# Patient Record
Sex: FEMALE | Race: BLACK OR AFRICAN AMERICAN | NOT HISPANIC OR LATINO | Employment: FULL TIME | URBAN - METROPOLITAN AREA
[De-identification: names, ages, dates, MRNs, and addresses within clinical notes are randomized per-mention and may not be internally consistent; named-entity substitution may affect disease eponyms.]

---

## 2017-09-12 ENCOUNTER — TRANSCRIBE ORDERS (OUTPATIENT)
Dept: RADIOLOGY | Facility: CLINIC | Age: 47
End: 2017-09-12

## 2017-09-12 ENCOUNTER — APPOINTMENT (OUTPATIENT)
Dept: LAB | Facility: CLINIC | Age: 47
End: 2017-09-12

## 2017-09-12 DIAGNOSIS — Z02.1 PRE-EMPLOYMENT HEALTH SCREENING EXAMINATION: Primary | ICD-10-CM

## 2017-09-12 DIAGNOSIS — Z02.1 PRE-EMPLOYMENT HEALTH SCREENING EXAMINATION: ICD-10-CM

## 2017-09-12 LAB — RUBV IGG SERPL IA-ACNC: 54.9 IU/ML

## 2017-09-12 PROCEDURE — 86765 RUBEOLA ANTIBODY: CPT

## 2017-09-12 PROCEDURE — 86735 MUMPS ANTIBODY: CPT

## 2017-09-12 PROCEDURE — 86480 TB TEST CELL IMMUN MEASURE: CPT

## 2017-09-12 PROCEDURE — 36415 COLL VENOUS BLD VENIPUNCTURE: CPT

## 2017-09-12 PROCEDURE — 86787 VARICELLA-ZOSTER ANTIBODY: CPT

## 2017-09-12 PROCEDURE — 86706 HEP B SURFACE ANTIBODY: CPT

## 2017-09-12 PROCEDURE — 86762 RUBELLA ANTIBODY: CPT

## 2017-09-13 LAB — HBV SURFACE AB SER-ACNC: 60.62 MIU/ML

## 2017-09-14 LAB
MEV IGG SER QL: NORMAL
MUV IGG SER QL: NORMAL
VZV IGG SER IA-ACNC: NORMAL

## 2017-09-15 LAB — QUANTIFERON-TB GOLD IN TUBE: NORMAL

## 2017-09-18 ENCOUNTER — APPOINTMENT (OUTPATIENT)
Dept: LAB | Facility: CLINIC | Age: 47
End: 2017-09-18

## 2017-09-18 ENCOUNTER — TRANSCRIBE ORDERS (OUTPATIENT)
Dept: LAB | Facility: CLINIC | Age: 47
End: 2017-09-18

## 2017-09-18 DIAGNOSIS — Z11.1 SCREENING EXAMINATION FOR PULMONARY TUBERCULOSIS: Primary | ICD-10-CM

## 2017-09-18 DIAGNOSIS — Z11.1 SCREENING EXAMINATION FOR PULMONARY TUBERCULOSIS: ICD-10-CM

## 2017-09-18 PROCEDURE — 36415 COLL VENOUS BLD VENIPUNCTURE: CPT

## 2017-09-18 PROCEDURE — 86480 TB TEST CELL IMMUN MEASURE: CPT

## 2017-09-21 LAB
ANNOTATION COMMENT IMP: NORMAL
GAMMA INTERFERON BACKGROUND BLD IA-ACNC: 0.04 IU/ML
M TB IFN-G BLD-IMP: NEGATIVE
M TB IFN-G CD4+ BCKGRND COR BLD-ACNC: 0.06 IU/ML
M TB IFN-G CD4+ T-CELLS BLD-ACNC: 0.1 IU/ML
MITOGEN IGNF BLD-ACNC: 7.14 IU/ML
QUANTIFERON-TB GOLD IN TUBE: NORMAL
SERVICE CMNT-IMP: NORMAL

## 2018-08-03 ENCOUNTER — APPOINTMENT (EMERGENCY)
Dept: RADIOLOGY | Facility: HOSPITAL | Age: 48
End: 2018-08-03
Payer: COMMERCIAL

## 2018-08-03 ENCOUNTER — HOSPITAL ENCOUNTER (EMERGENCY)
Facility: HOSPITAL | Age: 48
Discharge: HOME | End: 2018-08-03
Attending: EMERGENCY MEDICINE
Payer: COMMERCIAL

## 2018-08-03 VITALS
TEMPERATURE: 96.7 F | SYSTOLIC BLOOD PRESSURE: 127 MMHG | HEIGHT: 63 IN | WEIGHT: 180 LBS | HEART RATE: 67 BPM | OXYGEN SATURATION: 95 % | RESPIRATION RATE: 18 BRPM | BODY MASS INDEX: 31.89 KG/M2 | DIASTOLIC BLOOD PRESSURE: 67 MMHG

## 2018-08-03 DIAGNOSIS — M25.511 RIGHT SHOULDER PAIN: Primary | ICD-10-CM

## 2018-08-03 PROCEDURE — 99283 EMERGENCY DEPT VISIT LOW MDM: CPT

## 2018-08-03 PROCEDURE — 73030 X-RAY EXAM OF SHOULDER: CPT

## 2018-08-03 RX ORDER — LIDOCAINE 50 MG/G
1 PATCH TOPICAL ONCE
Status: DISCONTINUED | OUTPATIENT
Start: 2018-08-03 | End: 2018-08-03 | Stop reason: HOSPADM

## 2018-08-03 RX ORDER — METHOCARBAMOL 500 MG/1
500 TABLET, FILM COATED ORAL 2 TIMES DAILY
Qty: 10 TABLET | Refills: 0 | Status: SHIPPED | OUTPATIENT
Start: 2018-08-03 | End: 2018-08-25

## 2018-08-03 RX ADMIN — LIDOCAINE 1 PATCH: 50 PATCH CUTANEOUS at 11:56

## 2018-08-03 NOTE — DISCHARGE INSTRUCTIONS
Shoulder Pain, Ambulatory Care   GENERAL INFORMATION:   Shoulder pain  is a common problem and can affect your daily activities  Pain can be caused by a problem within your shoulder  Shoulder pain may also be caused by pain that spreads to your shoulder from another part of your body  Seek immediate care for the following symptoms:   · Severe pain    · Inability to move your arm or shoulder    · Numbness or tingling in your shoulder or arm  Treatment for shoulder pain  may include any of the following:  · Acetaminophen  decreases pain and fever  It is available without a doctor's order  Ask how much to take and how often to take it  Follow directions  Acetaminophen can cause liver damage if not taken correctly  · NSAIDs  help decrease swelling and pain or fever  This medicine is available with or without a doctor's order  NSAIDs can cause stomach bleeding or kidney problems in certain people  If you take blood thinner medicine, always ask your healthcare provider if NSAIDs are safe for you  Always read the medicine label and follow directions  · A steroid injection  may help decrease pain and swelling  · Surgery  may be needed for long-term pain and loss of function  Manage your symptoms:   · Apply ice  on your shoulder for 20 to 30 minutes every 2 hours or as directed  Use an ice pack, or put crushed ice in a plastic bag  Cover it with a towel  Ice helps prevent tissue damage and decreases swelling and pain  · Apply heat if ice does not help your symptoms  Apply heat on your shoulder for 20 to 30 minutes every 2 hours for as many days as directed  Heat helps decrease pain and muscle spasms  · Go to physical or occupational therapy as directed  A physical therapist teaches you exercises to help improve movement and strength, and to decrease pain  An occupational therapist teaches you skills to help with your daily activities  Prevent shoulder pain:   · Stretch and strengthen your shoulder  Use proper technique during exercises and sports  · Limit activities as directed  Try to avoid repeated overhead movements  Follow up with your healthcare provider or orthopedist as directed:  Write down your questions so you remember to ask them during your visits  CARE AGREEMENT:   You have the right to help plan your care  Learn about your health condition and how it may be treated  Discuss treatment options with your caregivers to decide what care you want to receive  You always have the right to refuse treatment  The above information is an  only  It is not intended as medical advice for individual conditions or treatments  Talk to your doctor, nurse or pharmacist before following any medical regimen to see if it is safe and effective for you  © 2014 [address] is for End User's use only and may not be sold, redistributed or otherwise used for commercial purposes  All illustrations and images included in CareNotes® are the copyrighted property of [employer]  or [employer]

## 2018-08-03 NOTE — ED PROVIDER NOTES
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, the patient fell backwards and she also fell,  Her shoulder and arm being caught between the body of the patient  And the bed  Patient reports pain over her right shoulder that radiates into her upper arm  States that she is able to  Range her shoulder however with pain  Denies any midline tenderness of her neck  Patient also complains of back pain, right-sided  No history of back pain  Has taken naproxen for pain with relief of back pain  She denies paresthesias, numbness, open wounds, bruising, swelling  Denies injury over the right arm past             None       History reviewed  No pertinent past medical history  History reviewed  No pertinent surgical history  History reviewed  No pertinent family history  I have reviewed and agree with the history as documented  Social History   Substance Use Topics    Smoking status: Never Smoker    Smokeless tobacco: Never Used    Alcohol use Yes      Comment: occassional        Review of Systems   Constitutional: Negative for chills and fever  Musculoskeletal: Positive for arthralgias, back pain and myalgias  Negative for gait problem, joint swelling, neck pain and neck stiffness  Skin: Negative for color change and wound  Neurological: Negative for weakness and numbness  Physical Exam  Physical Exam   Constitutional: She appears well-developed and well-nourished  No distress  HENT:   Head: Normocephalic  Pulmonary/Chest: Effort normal and breath sounds normal    Musculoskeletal: She exhibits tenderness  She exhibits no edema or deformity          Right shoulder: She exhibits decreased range of motion and tenderness  She exhibits no bony tenderness, no swelling, no effusion, no crepitus, no deformity, no laceration, no pain, no spasm, normal pulse and normal strength  Arms:  Mildly decreased ROM of right shoulder  5/5 strength  ttp over trapezius and over shoulder joint space  No swelling, bruising, rash, open wounds  NVI  Neurological: She is alert  Skin: Skin is warm  Capillary refill takes less than 2 seconds  No rash noted  She is not diaphoretic  No erythema  Psychiatric: She has a normal mood and affect  Nursing note and vitals reviewed  Vital Signs  ED Triage Vitals [08/03/18 1101]   Temperature Pulse Respirations Blood Pressure SpO2   (!) 96 7 °F (35 9 °C) 67 18 127/67 95 %      Temp Source Heart Rate Source Patient Position - Orthostatic VS BP Location FiO2 (%)   Tympanic Monitor Sitting Left arm --      Pain Score       8           Vitals:    08/03/18 1101   BP: 127/67   Pulse: 67   Patient Position - Orthostatic VS: Sitting       Visual Acuity      ED Medications  Medications - No data to display    Diagnostic Studies  Results Reviewed     None                 XR shoulder 2+ views RIGHT   ED Interpretation by Jose Alfredo Palomares PA-C (08/03 1201)   No fracture noted  Final Result by Rossy Austin MD (08/03 1209)      No acute osseous abnormality              Workstation performed: [id number]                    Procedures  Procedures       Phone Contacts  ED Phone Contact    ED Course                               MDM  Number of Diagnoses or Management Options  Right shoulder pain:     CritCare Time    Disposition  Final diagnoses:   Right shoulder pain     Time reflects when diagnosis was documented in both MDM as applicable and the Disposition within this note     Time User Action Codes Description Comment    8/3/2018 12:03 PM [place], [address] Right shoulder pain       ED Disposition     ED Disposition Condition Comment    Discharge Linda Brady discharge to home/self care  Condition at discharge: Good        Follow-up Information     Follow up With Specialties Details Why Contact Info    Annette Bey MD Occupational Medicine Schedule an appointment as soon as possible for a visit Follow up with occupational medicine if needed if pain persist   [address] [facility] [address]  [phone]            Discharge Medication List as of 8/3/2018 12:13 PM      START taking these medications    Details   methocarbamol (ROBAXIN) 500 mg tablet Take 1 tablet (500 mg total) by mouth 2 (two) times a day for 5 days, Starting Fri 8/3/2018, Until Wed 8/8/2018, Print           No discharge procedures on file      ED Provider  Electronically Signed by           Cynthia Yip PA-C  08/03/18 0272

## 2018-08-25 ENCOUNTER — APPOINTMENT (EMERGENCY)
Dept: RADIOLOGY | Facility: HOSPITAL | Age: 48
End: 2018-08-25
Payer: COMMERCIAL

## 2018-08-25 ENCOUNTER — HOSPITAL ENCOUNTER (EMERGENCY)
Facility: HOSPITAL | Age: 48
Discharge: HOME | End: 2018-08-25
Attending: EMERGENCY MEDICINE | Admitting: EMERGENCY MEDICINE
Payer: COMMERCIAL

## 2018-08-25 VITALS
HEART RATE: 85 BPM | SYSTOLIC BLOOD PRESSURE: 139 MMHG | TEMPERATURE: 97.7 F | BODY MASS INDEX: 32.78 KG/M2 | HEIGHT: 63 IN | DIASTOLIC BLOOD PRESSURE: 90 MMHG | OXYGEN SATURATION: 100 % | RESPIRATION RATE: 18 BRPM | WEIGHT: 185 LBS

## 2018-08-25 DIAGNOSIS — R10.9 ABDOMINAL PAIN: ICD-10-CM

## 2018-08-25 DIAGNOSIS — K83.8 COMMON BILE DUCT DILATION: Primary | ICD-10-CM

## 2018-08-25 LAB
ALBUMIN SERPL BCP-MCNC: 3.6 G/DL (ref 3.5–5)
ALP SERPL-CCNC: 53 U/L (ref 46–116)
ALT SERPL W P-5'-P-CCNC: 22 U/L (ref 12–78)
ANION GAP SERPL CALCULATED.3IONS-SCNC: 6 MMOL/L (ref 4–13)
AST SERPL W P-5'-P-CCNC: 20 U/L (ref 5–45)
BASOPHILS # BLD AUTO: 0.01 THOUSANDS/ΜL (ref 0–0.1)
BASOPHILS NFR BLD AUTO: 0 % (ref 0–1)
BILIRUB SERPL-MCNC: 0.41 MG/DL (ref 0.2–1)
BILIRUB UR QL STRIP: NEGATIVE
BUN SERPL-MCNC: 12 MG/DL (ref 5–25)
CALCIUM SERPL-MCNC: 8.8 MG/DL (ref 8.3–10.1)
CHLORIDE SERPL-SCNC: 105 MMOL/L (ref 100–108)
CLARITY UR: CLEAR
CLARITY, POC: CLEAR
CO2 SERPL-SCNC: 25 MMOL/L (ref 21–32)
COLOR UR: YELLOW
COLOR, POC: YELLOW
CREAT SERPL-MCNC: 0.91 MG/DL (ref 0.6–1.3)
EOSINOPHIL # BLD AUTO: 0.03 THOUSAND/ΜL (ref 0–0.61)
EOSINOPHIL NFR BLD AUTO: 1 % (ref 0–6)
ERYTHROCYTE [DISTWIDTH] IN BLOOD BY AUTOMATED COUNT: 13 % (ref 11.6–15.1)
EXT PREG TEST URINE: NORMAL
GFR SERPL CREATININE-BSD FRML MDRD: 85 ML/MIN/1.73SQ M
GLUCOSE SERPL-MCNC: 88 MG/DL (ref 65–140)
GLUCOSE UR STRIP-MCNC: NEGATIVE MG/DL
HCT VFR BLD AUTO: 36.8 % (ref 34.8–46.1)
HGB BLD-MCNC: 11.8 G/DL (ref 11.5–15.4)
HGB UR QL STRIP.AUTO: NEGATIVE
IMM GRANULOCYTES # BLD AUTO: 0.02 THOUSAND/UL (ref 0–0.2)
IMM GRANULOCYTES NFR BLD AUTO: 0 % (ref 0–2)
KETONES UR STRIP-MCNC: NEGATIVE MG/DL
LEUKOCYTE ESTERASE UR QL STRIP: NEGATIVE
LIPASE SERPL-CCNC: 131 U/L (ref 73–393)
LYMPHOCYTES # BLD AUTO: 1.34 THOUSANDS/ΜL (ref 0.6–4.47)
LYMPHOCYTES NFR BLD AUTO: 24 % (ref 14–44)
MCH RBC QN AUTO: 29.1 PG (ref 26.8–34.3)
MCHC RBC AUTO-ENTMCNC: 32.1 G/DL (ref 31.4–37.4)
MCV RBC AUTO: 91 FL (ref 82–98)
MONOCYTES # BLD AUTO: 0.36 THOUSAND/ΜL (ref 0.17–1.22)
MONOCYTES NFR BLD AUTO: 7 % (ref 4–12)
NEUTROPHILS # BLD AUTO: 3.74 THOUSANDS/ΜL (ref 1.85–7.62)
NEUTS SEG NFR BLD AUTO: 68 % (ref 43–75)
NITRITE UR QL STRIP: NEGATIVE
NRBC BLD AUTO-RTO: 0 /100 WBCS
PH UR STRIP.AUTO: 7 [PH] (ref 4.5–8)
PLATELET # BLD AUTO: 291 THOUSANDS/UL (ref 149–390)
PMV BLD AUTO: 9.1 FL (ref 8.9–12.7)
POTASSIUM SERPL-SCNC: 4.1 MMOL/L (ref 3.5–5.3)
PROT SERPL-MCNC: 7.3 G/DL (ref 6.4–8.2)
PROT UR STRIP-MCNC: NEGATIVE MG/DL
RBC # BLD AUTO: 4.05 MILLION/UL (ref 3.81–5.12)
SODIUM SERPL-SCNC: 136 MMOL/L (ref 136–145)
SP GR UR STRIP.AUTO: 1.01 (ref 1–1.03)
UROBILINOGEN UR QL STRIP.AUTO: 0.2 E.U./DL
WBC # BLD AUTO: 5.5 THOUSAND/UL (ref 4.31–10.16)

## 2018-08-25 PROCEDURE — 76705 ECHO EXAM OF ABDOMEN: CPT

## 2018-08-25 PROCEDURE — 85025 COMPLETE CBC W/AUTO DIFF WBC: CPT | Performed by: EMERGENCY MEDICINE

## 2018-08-25 PROCEDURE — 74177 CT ABD & PELVIS W/CONTRAST: CPT

## 2018-08-25 PROCEDURE — 80053 COMPREHEN METABOLIC PANEL: CPT | Performed by: EMERGENCY MEDICINE

## 2018-08-25 PROCEDURE — 36415 COLL VENOUS BLD VENIPUNCTURE: CPT

## 2018-08-25 PROCEDURE — 93005 ELECTROCARDIOGRAM TRACING: CPT

## 2018-08-25 PROCEDURE — 81025 URINE PREGNANCY TEST: CPT | Performed by: EMERGENCY MEDICINE

## 2018-08-25 PROCEDURE — 96375 TX/PRO/DX INJ NEW DRUG ADDON: CPT

## 2018-08-25 PROCEDURE — 96374 THER/PROPH/DIAG INJ IV PUSH: CPT

## 2018-08-25 PROCEDURE — 99284 EMERGENCY DEPT VISIT MOD MDM: CPT

## 2018-08-25 PROCEDURE — 99242 OFF/OP CONSLTJ NEW/EST SF 20: CPT | Performed by: SURGERY

## 2018-08-25 PROCEDURE — 83690 ASSAY OF LIPASE: CPT | Performed by: EMERGENCY MEDICINE

## 2018-08-25 PROCEDURE — 81003 URINALYSIS AUTO W/O SCOPE: CPT

## 2018-08-25 RX ORDER — UBIDECARENONE 75 MG
CAPSULE ORAL DAILY
COMMUNITY

## 2018-08-25 RX ORDER — MULTIVITAMIN
1 CAPSULE ORAL DAILY
COMMUNITY

## 2018-08-25 RX ORDER — FAMOTIDINE 20 MG/1
20 TABLET, FILM COATED ORAL 2 TIMES DAILY
Qty: 30 TABLET | Refills: 0 | Status: SHIPPED | OUTPATIENT
Start: 2018-08-25 | End: 2022-03-22

## 2018-08-25 RX ORDER — ONDANSETRON 2 MG/ML
4 INJECTION INTRAMUSCULAR; INTRAVENOUS ONCE
Status: COMPLETED | OUTPATIENT
Start: 2018-08-25 | End: 2018-08-25

## 2018-08-25 RX ORDER — ONDANSETRON 4 MG/1
4 TABLET, FILM COATED ORAL EVERY 6 HOURS
Qty: 12 TABLET | Refills: 0 | Status: SHIPPED | OUTPATIENT
Start: 2018-08-25

## 2018-08-25 RX ADMIN — IOHEXOL 100 ML: 350 INJECTION, SOLUTION INTRAVENOUS at 13:57

## 2018-08-25 RX ADMIN — ONDANSETRON 4 MG: 2 INJECTION INTRAMUSCULAR; INTRAVENOUS at 13:35

## 2018-08-25 RX ADMIN — HYDROMORPHONE HYDROCHLORIDE 0.5 MG: 1 INJECTION, SOLUTION INTRAMUSCULAR; INTRAVENOUS; SUBCUTANEOUS at 15:20

## 2018-08-25 NOTE — CONSULTS
Consultation - General Surgery   Shabana Schaeffer 46 y o  female MRN: [id number]  Unit/Bed#: ED 25 Encounter: [id number]    Assessment/Plan     Assessment:  48yF w/ non-specific abdominal pain  No RUQ pain  Pain is not surgical and unrelated to dilated CBD    Plan:  Rec GI workup - consider outpt depending on whether pain persists in here    History of Present Illness     HPI:  Shabana Schaeffer is a 46 y o  female with no past medical or surgical history who presents with acute onset abdominal pain  Around 7AM she had a cup of soup  About half an hour later she developed intense left sided abdominal pain  The pain radiates to the midline and up to the epigastric area  There is no associated fever, chills, nausea  She states she has been having normal bowel movements  She had an episode like this a few months ago that last several hours and then went away  Labs including LFTs, lipase, CBC were all normal  CT only showed a 17mm CBD  There are no stones seen, no pancreatic masses  She denies RUQ pain  RUQ is completely non-tender to palpation  US was done showing a 13mm CBD with moderate intrahepatic biliary dilation - there was no choledocholithiasis  Consult to Surgery - General  Consult performed by: Marisa Scales ordered by: Eugene Teixeira          Review of Systems   Constitutional: Negative for chills and fever  Respiratory: Negative for chest tightness and shortness of breath  Cardiovascular: Negative for chest pain and palpitations  Gastrointestinal: Positive for abdominal pain  Negative for abdominal distention, nausea and vomiting  Genitourinary: Negative for dysuria and flank pain  Musculoskeletal: Negative for back pain and neck pain  Skin: Negative for rash and wound  Allergic/Immunologic: Negative for environmental allergies and food allergies  Neurological: Negative for dizziness and light-headedness  All other systems reviewed and are negative        Historical Information   No past medical history on file  No past surgical history on file  Social History   History   Alcohol Use    Yes     Comment: occassional     History   Drug Use No     History   Smoking Status    Never Smoker   Smokeless Tobacco    Never Used     Family History: non-contributory    Meds/Allergies   all current active meds have been reviewed, current meds:   No current facility-administered medications for this encounter  and PTA meds:   Prior to Admission Medications   Prescriptions Last Dose Informant Patient Reported? Taking? COLLAGEN PO   Yes Yes   Sig: Take by mouth   Melatonin-Pyridoxine (MELATIN PO)   Yes Yes   Sig: Take by mouth as needed   Multiple Vitamin (MULTIVITAMIN) capsule   Yes Yes   Sig: Take 1 capsule by mouth daily   cyanocobalamin (VITAMIN B-12) 100 mcg tablet   Yes Yes   Sig: Take by mouth daily      Facility-Administered Medications: None     No Known Allergies    Objective   First Vitals:   Blood Pressure: 124/74 (08/25/18 1107)  Pulse: 71 (08/25/18 1107)  Temperature: 97 7 °F (36 5 °C) (08/25/18 1107)  Temp Source: Oral (08/25/18 1107)  Respirations: 20 (08/25/18 1107)  Height: 5' 3" (160 cm) (08/25/18 1107)  Weight - Scale: 83 9 kg (185 lb) (08/25/18 1107)  SpO2: 98 % (08/25/18 1107)    Current Vitals:   Blood Pressure: 139/90 (08/25/18 1523)  Pulse: 104 (08/25/18 1523)  Temperature: 97 7 °F (36 5 °C) (08/25/18 1107)  Temp Source: Oral (08/25/18 1107)  Respirations: 22 (08/25/18 1523)  Height: 5' 3" (160 cm) (08/25/18 1107)  Weight - Scale: 83 9 kg (185 lb) (08/25/18 1107)  SpO2: 100 % (08/25/18 1523)    No intake or output data in the 24 hours ending 08/25/18 1527    Invasive Devices     Peripheral Intravenous Line            Peripheral IV 08/25/18 Left Antecubital less than 1 day                Physical Exam   Constitutional: She is oriented to person, place, and time  She appears well-developed and well-nourished  HENT:   Head: Normocephalic and atraumatic  Eyes: EOM are normal  Pupils are equal, round, and reactive to light  Cardiovascular: Normal rate and regular rhythm  Pulmonary/Chest: Effort normal  No respiratory distress  Abdominal: Soft  She exhibits no distension and no mass  There is tenderness  There is no rebound and no guarding  Musculoskeletal: Normal range of motion  She exhibits no edema or deformity  Neurological: She is alert and oriented to person, place, and time  Skin: Skin is warm  No rash noted  Psychiatric: She has a normal mood and affect  Her behavior is normal  Judgment and thought content normal        Lab Results:   I have personally reviewed pertinent lab results  , CBC:   Lab Results   Component Value Date    WBC 5 50 08/25/2018    HGB 11 8 08/25/2018    HCT 36 8 08/25/2018    MCV 91 08/25/2018     08/25/2018    MCH 29 1 08/25/2018    MCHC 32 1 08/25/2018    RDW 13 0 08/25/2018    MPV 9 1 08/25/2018    NRBC 0 08/25/2018   , CMP:   Lab Results   Component Value Date     08/25/2018    K 4 1 08/25/2018     08/25/2018    CO2 25 08/25/2018    ANIONGAP 6 08/25/2018    BUN 12 08/25/2018    CREATININE 0 91 08/25/2018    GLUCOSE 88 08/25/2018    CALCIUM 8 8 08/25/2018    AST 20 08/25/2018    ALT 22 08/25/2018    ALKPHOS 53 08/25/2018    PROT 7 3 08/25/2018    BILITOT 0 41 08/25/2018    EGFR 85 08/25/2018     Imaging: I have personally reviewed pertinent reports  EKG, Pathology, and Other Studies: I have personally reviewed pertinent reports  and I have personally reviewed pertinent films in PACS    Counseling / Coordination of Care  Total floor / unit time spent today 20 minutes  Greater than 50% of total time was spent with the patient and / or family counseling and / or coordination of care  A description of the counseling / coordination of care: 20

## 2018-08-25 NOTE — ED ATTENDING ATTESTATION
Litzy Mehta MD, saw and evaluated the patient  I have discussed the patient with the resident/non-physician practitioner and agree with the resident's/non-physician practitioner's findings, Plan of Care, and MDM as documented in the resident's/non-physician practitioner's note, except where noted  All available labs and Radiology studies were reviewed  At this point I agree with the current assessment done in the Emergency Department  I have conducted an independent evaluation of this patient a history and physical is as follows:    OA: 47 y/o f with vague LLQ pain since awakening this morning and has been progressive since onset  + chills, no fevers  Pain is 8/10, constant and sharp "like someone is wringing my bowls " Denies n/v/d  No change in stools  Tolerated PO this morning but only a small amount  Admits to eating ceviche on Thursday night, no one else at the same food  Had similar, less intense episode of pain a few months ago, did not seek evaluation with pcp at that time  No previous abdominal surgeries  Denies cp/chest pressure/sob/CARRILLO/palpitations  PE, well developed f nontoxic but uncomfortable appearing, VSS, Nc/AT, MMM, anicteric sclera/conjunctiva, neck supple/FROM, RR, lungs CTAB, abd soft, +BS, + ttp diffusely over abdomen with most notable over LLQ, - r/g, - mass, - CVA ttp, - LE edema/calf ttp, + 2 distal pulses, capillary refill < 2 sec, AAO  A/p abd pain, EKG given age however denies CP pain, Well's 0, labs, CT imaging, pt currently declining pain control, trial of anti-emetic, re-eval and treat accordingly        Critical Care Time  CritCare Time    Procedures

## 2018-08-25 NOTE — ED RE-EVALUATION NOTE
Pt with improved sxms asking for food  Evaluated by surgery, recommends outpatient GI f/u  Pt would like to go, declines observational admission  Understands return and f/u precautions        Jose Barcenas MD  08/28/18 5146

## 2018-08-25 NOTE — DISCHARGE INSTRUCTIONS
Abdominal Pain   WHAT YOU NEED TO KNOW:   Abdominal pain can be dull, achy, or sharp  You may have pain in one area of your abdomen, or in your entire abdomen  Your pain may be caused by a condition such as constipation, food sensitivity or poisoning, infection, or a blockage  Abdominal pain can also be from a hernia, appendicitis, or an ulcer  Liver, gallbladder, or kidney conditions can also cause abdominal pain  The cause of your abdominal pain may be unknown  DISCHARGE INSTRUCTIONS:   Return to the emergency department if:   · You have new chest pain or shortness of breath  · You have pulsing pain in your upper abdomen or lower back that suddenly becomes constant  · Your pain is in the right lower abdominal area and worsens with movement  · You have a fever over 100 4°F (38°C) or shaking chills  · You are vomiting and cannot keep food or liquids down  · Your pain does not improve or gets worse over the next 8 to 12 hours  · You see blood in your vomit or bowel movements, or they look black and tarry  · Your skin or the whites of your eyes turn yellow  · You are a woman and have a large amount of vaginal bleeding that is not your monthly period  Contact your healthcare provider if:   · You have pain in your lower back  · You are a man and have pain in your testicles  · You have pain when you urinate  · You have questions or concerns about your condition or care  Follow up with your healthcare provider within 24 hours or as directed:  Write down your questions so you remember to ask them during your visits  Medicines:   · Medicines  may be given to calm your stomach and prevent vomiting or to decrease pain  Ask how to take pain medicine safely  · Take your medicine as directed  Contact your healthcare provider if you think your medicine is not helping or if you have side effects  Tell him of her if you are allergic to any medicine   Keep a list of the medicines, vitamins, and herbs you take  Include the amounts, and when and why you take them  Bring the list or the pill bottles to follow-up visits  Carry your medicine list with you in case of an emergency  © 2017 [address] Information is for End User's use only and may not be sold, redistributed or otherwise used for commercial purposes  All illustrations and images included in CareNotes® are the copyrighted property of [employer]  or [employer]thing[employer]  The above information is an  only  It is not intended as medical advice for individual conditions or treatments  Talk to your doctor, nurse or pharmacist before following any medical regimen to see if it is safe and effective for you

## 2018-08-25 NOTE — ED PROVIDER NOTES
History  Chief Complaint   Patient presents with    Abdominal Pain     PT "I woke up this morning with this really bad abd pain  I thought it was not b/c I had eaten, so when I got to work I had some coffee and some cream soup  And its just getting worse" Pt c/o lower abd pain that is intermitten     HPI    57-year-old female with a past medical history of GERD presents for evaluation of abdominal pain  Patient says she woke up with vague abdominal pain this morning which has progressively worsened  LLQ pain that began this morning which she thought were hunger pains since she missed dinner last night  She notes having similar pain a few months ago which lasted a few hours but was not as intense  Patient had coffee, water and soup when she got to work but did not have without in pain  Few minutes later she was doubled over with 10/10 pain that was constant with intervals of increased severity  Admits to hot flashes and dizziness earlier today  No fever, chest pain, SOB, nausea, vomiting, diarrhea, constipation, dysuria, or back pain  Prior to Admission Medications   Prescriptions Last Dose Informant Patient Reported? Taking? COLLAGEN PO   Yes Yes   Sig: Take by mouth   Melatonin-Pyridoxine (MELATIN PO)   Yes Yes   Sig: Take by mouth as needed   Multiple Vitamin (MULTIVITAMIN) capsule   Yes Yes   Sig: Take 1 capsule by mouth daily   cyanocobalamin (VITAMIN B-12) 100 mcg tablet   Yes Yes   Sig: Take by mouth daily      Facility-Administered Medications: None       No past medical history on file  No past surgical history on file  No family history on file  I have reviewed and agree with the history as documented  Social History   Substance Use Topics    Smoking status: Never Smoker    Smokeless tobacco: Never Used    Alcohol use Yes      Comment: occassional        Review of Systems   Constitutional: Negative for chills, diaphoresis, fatigue and fever     HENT: Negative for congestion, rhinorrhea and sore throat  Eyes: Negative for photophobia and visual disturbance  Respiratory: Negative for cough, chest tightness and shortness of breath  Cardiovascular: Negative for chest pain and palpitations  Gastrointestinal: Positive for abdominal pain  Negative for blood in stool, constipation, diarrhea, nausea and vomiting  Genitourinary: Positive for frequency  Negative for dysuria and hematuria  Musculoskeletal: Negative for back pain, gait problem, myalgias, neck pain and neck stiffness  Skin: Negative for pallor and rash  Neurological: Negative for weakness, light-headedness, numbness and headaches  Hematological: Negative for adenopathy  Does not bruise/bleed easily  All other systems reviewed and are negative  Physical Exam  ED Triage Vitals [08/25/18 1107]   Temperature Pulse Respirations Blood Pressure SpO2   97 7 °F (36 5 °C) 71 20 124/74 98 %      Temp Source Heart Rate Source Patient Position - Orthostatic VS BP Location FiO2 (%)   Oral Monitor Sitting Right arm --      Pain Score       6           Orthostatic Vital Signs  Vitals:    08/25/18 1233 08/25/18 1453 08/25/18 1523 08/25/18 1701   BP: 122/81 114/68 139/90    Pulse: 64 70 104 85   Patient Position - Orthostatic VS: Sitting Sitting Sitting        Physical Exam   Constitutional: She is oriented to person, place, and time  She appears well-developed and well-nourished  No distress  Patient is alert and oriented, appears well and nontoxic, in no acute distress   HENT:   Head: Normocephalic and atraumatic  Mouth/Throat: Oropharynx is clear and moist  No oropharyngeal exudate  Eyes: Conjunctivae and EOM are normal  Pupils are equal, round, and reactive to light  Neck: Normal range of motion  Neck supple  Cardiovascular: Normal rate, regular rhythm, normal heart sounds and intact distal pulses  Pulmonary/Chest: Effort normal and breath sounds normal  No respiratory distress  Abdominal: Soft   Bowel sounds are normal  She exhibits no distension and no mass  There is tenderness  There is no rebound and no guarding  No hernia  Diffuse tenderness throughout   Musculoskeletal: Normal range of motion  Lymphadenopathy:     She has no cervical adenopathy  Neurological: She is alert and oriented to person, place, and time  No facial asymmetry noted, CN 2-12 intact, full ROM of upper and lower extremities, muscle strength 5/5 throughout, DTRs normal, sensation intact throughout   Skin: Skin is warm and dry  Capillary refill takes less than 2 seconds  No rash noted  She is not diaphoretic  No erythema  No pallor  Psychiatric: She has a normal mood and affect  Her behavior is normal  Judgment and thought content normal    Nursing note and vitals reviewed  ED Medications  Medications   ondansetron (ZOFRAN) injection 4 mg (4 mg Intravenous Given 8/25/18 1335)   iohexol (OMNIPAQUE) 350 MG/ML injection (MULTI-DOSE) 100 mL (100 mL Intravenous Given 8/25/18 1357)   HYDROmorphone (DILAUDID) injection 0 5 mg (0 5 mg Intravenous Given 8/25/18 1520)       Diagnostic Studies  Results Reviewed     Procedure Component Value Units Date/Time    Comprehensive metabolic panel [[id number]] Collected:  08/25/18 1118    Lab Status:  Final result Specimen:  Blood from Arm, Left Updated:  08/25/18 1204     Sodium 136 mmol/L      Potassium 4 1 mmol/L      Chloride 105 mmol/L      CO2 25 mmol/L      Anion Gap 6 mmol/L      BUN 12 mg/dL      Creatinine 0 91 mg/dL      Glucose 88 mg/dL      Calcium 8 8 mg/dL      AST 20 U/L      ALT 22 U/L      Alkaline Phosphatase 53 U/L      Total Protein 7 3 g/dL      Albumin 3 6 g/dL      Total Bilirubin 0 41 mg/dL      eGFR 85 ml/min/1 73sq m     Narrative:         National Kidney Disease Education Program recommendations are as follows:  GFR calculation is accurate only with a steady state creatinine  Chronic Kidney disease less than 60 ml/min/1 73 sq  meters  Kidney failure less than 15 ml/min/1 73 sq  meters      Lipase [[id number]]  (Normal) Collected:  08/25/18 1118    Lab Status:  Final result Specimen:  Blood from Arm, Left Updated:  08/25/18 1204     Lipase 131 u/L     POCT urinalysis dipstick [[id number]]  (Normal) Resulted:  08/25/18 1139    Lab Status:  Final result Specimen:  Urine from Urine, Other Updated:  08/25/18 1140     Color, UA Yellow     Clarity, UA Clear    POCT pregnancy, urine [[id number]]  (Normal) Resulted:  08/25/18 1139    Lab Status:  Final result Specimen:  Urine Updated:  08/25/18 1139     EXT PREG TEST UR (Ref: Negative) Negative (-)    ED Urine Macroscopic [[id number]] Collected:  08/25/18 1152    Lab Status:  Final result Specimen:  Urine Updated:  08/25/18 1138     Color, UA Yellow     Clarity, UA Clear     pH, UA 7 0     Leukocytes, UA Negative     Nitrite, UA Negative     Protein, UA Negative mg/dl      Glucose, UA Negative mg/dl      Ketones, UA Negative mg/dl      Urobilinogen, UA 0 2 E U /dl      Bilirubin, UA Negative     Blood, UA Negative     Specific Gravity, UA 1 015    Narrative:       CLINITEK RESULT    CBC and differential [[id number]] Collected:  08/25/18 1118    Lab Status:  Final result Specimen:  Blood from Arm, Left Updated:  08/25/18 1130     WBC 5 50 Thousand/uL      RBC 4 05 Million/uL      Hemoglobin 11 8 g/dL      Hematocrit 36 8 %      MCV 91 fL      MCH 29 1 pg      MCHC 32 1 g/dL      RDW 13 0 %      MPV 9 1 fL      Platelets 598 Thousands/uL      nRBC 0 /100 WBCs      Neutrophils Relative 68 %      Immat GRANS % 0 %      Lymphocytes Relative 24 %      Monocytes Relative 7 %      Eosinophils Relative 1 %      Basophils Relative 0 %      Neutrophils Absolute 3 74 Thousands/µL      Immature Grans Absolute 0 02 Thousand/uL      Lymphocytes Absolute 1 34 Thousands/µL      Monocytes Absolute 0 36 Thousand/µL      Eosinophils Absolute 0 03 Thousand/µL      Basophils Absolute 0 01 Thousands/µL                  US gallbladder   Final Result by Donita Chowdhury MD (08/25 1622) 1   Common bile duct is dilated up to 1 3 cm near the velasquez hepatis  The distal common bile duct is not visualized  Moderate intrahepatic biliary dilation  2   Unremarkable gallbladder  No cholelithiasis or choledocholithiasis is visualized  Consider MRCP for elucidation of an underlying etiology of the biliary dilation  Workstation performed: [id number]         CT abdomen pelvis with contrast   Final Result by Manoj Gilliam MD (08/25 1431)      Dilated common bile duct measuring 17 mm  Follow-up outpatient nonemergent contrast enhanced abdominal MRI/MRCP is recommended for further evaluation  Thickening of the distal rectal or vulvar region, correlate with direct visualization  Workstation performed: [id number]               Procedures  Procedures      Phone Consults  ED Phone Contact    ED Course  ED Course as of Aug 25 1954   Sat Aug 25, 2018   1433 CTAP: Dilated common bile duct measuring 17 mm  Follow-up outpatient nonemergent contrast enhanced abdominal MRI/MRCP is recommended for further evaluation  [id number] Patient notes improvement of pain  Discussed lab and imaging results with patient  General surgery was consulted as well and discussed results with patient, recommending outpatient GI workup  Offered patient PO challenge here but declined, patient would like to go home                             [place]' Criteria for PE      Most Recent Value   Wells' Criteria for PE   Clinical signs and symptoms of DVT  0 Filed at: 08/25/2018 1725   PE is primary diagnosis or equally likely  0 Filed at: 08/25/2018 1725   HR >100  0 Filed at: 08/25/2018 1725   Immobilization at least 3 days or Surgery in the previous 4 weeks  0 Filed at: 08/25/2018 1725   Previous, objectively diagnosed PE or DVT  0 Filed at: 08/25/2018 1725   Hemoptysis  0 Filed at: 08/25/2018 1725   Malignancy with treatment within 6 months or palliative  0 Filed at: 08/25/2018 1725   Wells' Criteria Total  0 Filed at: 08/25/2018 1725            [facility]  Number of Diagnoses or Management Options  Abdominal pain:   Common bile duct dilation:   Diagnosis management comments: Impression: 50yo male presents for evaluation of abdominal pain  Ddx: diverticulitis, UTI, gastritis  Plan: cbc, cmp, lipase, ua, CTAP    The patient was instructed to follow up as documented  Strict return precautions were discussed with the patient and the patient was instructed to return to the emergency department immediately if symptoms worsen  The patient/patient family member acknowledged and were in agreement with plan  CritCare Time    Disposition  Final diagnoses:   Common bile duct dilation   Abdominal pain     Time reflects when diagnosis was documented in both MDM as applicable and the Disposition within this note     Time User Action Codes Description Comment    8/25/2018  3:01 PM Alex Young [K83 8] Common bile duct dilation     8/25/2018  4:54 PM Alex Young [R10 9] Abdominal pain       ED Disposition     ED Disposition Condition Comment    Discharge  Mini Modi discharge to home/self care      Condition at discharge: Good        Follow-up Information     Follow up With Specialties Details Why [facility] [id number] [facility] Go in 3 days As needed, If symptoms worsen [address] [phone] [facility] Schedule an appointment as soon as possible for a visit As needed, If symptoms worsen [address] [facility]  [phone]          Discharge Medication List as of 8/25/2018  5:11 PM      START taking these medications    Details   famotidine (PEPCID) 20 mg tablet Take 1 tablet (20 mg total) by mouth 2 (two) times a day, Starting Sat 8/25/2018, Print      ondansetron (ZOFRAN) 4 mg tablet Take 1 tablet (4 mg total) by mouth every 6 (six) hours, Starting Sat 8/25/2018, Print CONTINUE these medications which have NOT CHANGED    Details   COLLAGEN PO Take by mouth, Historical Med      cyanocobalamin (VITAMIN B-12) 100 mcg tablet Take by mouth daily, Historical Med      Melatonin-Pyridoxine (MELATIN PO) Take by mouth as needed, Historical Med      Multiple Vitamin (MULTIVITAMIN) capsule Take 1 capsule by mouth daily, Historical Med           No discharge procedures on file  ED Provider  Attending physically available and evaluated Jeffrey Latham  FELISHA managed the patient along with the ED Attending      Electronically Signed by         Ernie Garces MD  08/25/18 4470

## 2018-08-27 LAB
ATRIAL RATE: 62 BPM
P AXIS: 47 DEGREES
PR INTERVAL: 220 MS
QRS AXIS: 22 DEGREES
QRSD INTERVAL: 80 MS
QT INTERVAL: 402 MS
QTC INTERVAL: 408 MS
T WAVE AXIS: 23 DEGREES
VENTRICULAR RATE: 62 BPM

## 2018-08-27 PROCEDURE — 93010 ELECTROCARDIOGRAM REPORT: CPT | Performed by: INTERNAL MEDICINE

## 2019-08-01 ENCOUNTER — HOSPITAL ENCOUNTER (EMERGENCY)
Facility: HOSPITAL | Age: 49
Discharge: HOME | End: 2019-08-01
Attending: EMERGENCY MEDICINE
Payer: OTHER MISCELLANEOUS

## 2019-08-01 ENCOUNTER — APPOINTMENT (EMERGENCY)
Dept: RADIOLOGY | Facility: HOSPITAL | Age: 49
End: 2019-08-01
Payer: OTHER MISCELLANEOUS

## 2019-08-01 VITALS
TEMPERATURE: 97.8 F | BODY MASS INDEX: 31.89 KG/M2 | DIASTOLIC BLOOD PRESSURE: 74 MMHG | HEART RATE: 65 BPM | WEIGHT: 180 LBS | SYSTOLIC BLOOD PRESSURE: 104 MMHG | OXYGEN SATURATION: 100 % | RESPIRATION RATE: 21 BRPM

## 2019-08-01 DIAGNOSIS — S80.811A ABRASION OF ANTERIOR LOWER LEG, RIGHT, INITIAL ENCOUNTER: ICD-10-CM

## 2019-08-01 DIAGNOSIS — M79.604 RIGHT LEG PAIN: Primary | ICD-10-CM

## 2019-08-01 PROCEDURE — 73590 X-RAY EXAM OF LOWER LEG: CPT

## 2019-08-01 PROCEDURE — 99283 EMERGENCY DEPT VISIT LOW MDM: CPT

## 2019-08-01 PROCEDURE — 90471 IMMUNIZATION ADMIN: CPT

## 2019-08-01 PROCEDURE — 90715 TDAP VACCINE 7 YRS/> IM: CPT | Performed by: PHYSICIAN ASSISTANT

## 2019-08-01 PROCEDURE — 99283 EMERGENCY DEPT VISIT LOW MDM: CPT | Performed by: PHYSICIAN ASSISTANT

## 2019-08-01 RX ADMIN — TETANUS TOXOID, REDUCED DIPHTHERIA TOXOID AND ACELLULAR PERTUSSIS VACCINE, ADSORBED 0.5 ML: 5; 2.5; 8; 8; 2.5 SUSPENSION INTRAMUSCULAR at 12:43

## 2019-08-01 NOTE — ED PROVIDER NOTES
History  Chief Complaint   Patient presents with    Leg Pain     pt ran [address] with R leg while at work  abrasion noted to R aguayo     44 y o  Presents with c o  Right shin abrasion and pain after being struck by leg of benjamín lift while trying to move pt back to bed  Denies numbness, tingling, loss of strength or sensation  Able to bear weight and ambulate but painful to do so  Prior to Admission Medications   Prescriptions Last Dose Informant Patient Reported? Taking? COLLAGEN PO   Yes No   Sig: Take by mouth   Melatonin-Pyridoxine (MELATIN PO)   Yes No   Sig: Take by mouth as needed   Multiple Vitamin (MULTIVITAMIN) capsule   Yes No   Sig: Take 1 capsule by mouth daily   cyanocobalamin (VITAMIN B-12) 100 mcg tablet   Yes No   Sig: Take by mouth daily   famotidine (PEPCID) 20 mg tablet   No No   Sig: Take 1 tablet (20 mg total) by mouth 2 (two) times a day   ondansetron (ZOFRAN) 4 mg tablet   No No   Sig: Take 1 tablet (4 mg total) by mouth every 6 (six) hours      Facility-Administered Medications: None       No past medical history on file  No past surgical history on file  No family history on file  I have reviewed and agree with the history as documented  Social History     Tobacco Use    Smoking status: Never Smoker    Smokeless tobacco: Never Used   Substance Use Topics    Alcohol use: Yes     Comment: occassional    Drug use: No        Review of Systems   Musculoskeletal: Positive for myalgias  Skin: Positive for wound  All other systems reviewed and are negative  Physical Exam  Physical Exam   Constitutional: She is oriented to person, place, and time  She appears well-developed and well-nourished  HENT:   Head: Normocephalic and atraumatic  Cardiovascular: Normal rate, regular rhythm and intact distal pulses  Pulmonary/Chest: Effort normal and breath sounds normal    Musculoskeletal: Normal range of motion     Neurological: She is alert and oriented to person, place, and time  Skin: Skin is warm and dry  Capillary refill takes less than 2 seconds  Abrasion and bruising noted  Psychiatric: She has a normal mood and affect  Her behavior is normal  Thought content normal    Nursing note and vitals reviewed  Vital Signs  ED Triage Vitals [08/01/19 1201]   Temperature Pulse Respirations Blood Pressure SpO2   97 8 °F (36 6 °C) 65 21 104/74 100 %      Temp Source Heart Rate Source Patient Position - Orthostatic VS BP Location FiO2 (%)   Oral Monitor Sitting Right arm --      Pain Score       9           Vitals:    08/01/19 1201   BP: 104/74   Pulse: 65   Patient Position - Orthostatic VS: Sitting         Visual Acuity      ED Medications  Medications   tetanus-diphtheria-acellular pertussis (BOOSTRIX) IM injection 0 5 mL (0 5 mL Intramuscular Given 8/1/19 1243)       Diagnostic Studies  Results Reviewed     None                 XR tibia fibula 2 views RIGHT    (Results Pending)              Procedures  Procedures       ED Course                               MDM  Number of Diagnoses or Management Options  Abrasion of anterior lower leg, right, initial encounter: new and requires workup  Right leg pain: new and requires workup     Amount and/or Complexity of Data Reviewed  Tests in the radiology section of CPT®: ordered and reviewed        Disposition  Final diagnoses:   Right leg pain   Abrasion of anterior lower leg, right, initial encounter     Time reflects when diagnosis was documented in both MDM as applicable and the Disposition within this note     Time User Action Codes Description Comment    8/1/2019  1:06 PM Kevin Major Add [V29 003] Right leg pain     8/1/2019  1:11 PM Kevin Major Add [S80 811A] Abrasion of anterior lower leg, right, initial encounter       ED Disposition     ED Disposition Condition Date/Time Comment    Discharge Stable u Aug 1, 2019  1:06 PM Yamilet Arizmendi discharge to home/self care              Follow-up Information Follow up With Specialties Details Why [address] Rakesh Beal MD Family Medicine   [address] [facility]  [phone]            Discharge Medication List as of 8/1/2019  1:12 PM      CONTINUE these medications which have NOT CHANGED    Details   COLLAGEN PO Take by mouth, Historical Med      cyanocobalamin (VITAMIN B-12) 100 mcg tablet Take by mouth daily, Historical Med      famotidine (PEPCID) 20 mg tablet Take 1 tablet (20 mg total) by mouth 2 (two) times a day, Starting Sat 8/25/2018, Print      Melatonin-Pyridoxine (MELATIN PO) Take by mouth as needed, Historical Med      Multiple Vitamin (MULTIVITAMIN) capsule Take 1 capsule by mouth daily, Historical Med      ondansetron (ZOFRAN) 4 mg tablet Take 1 tablet (4 mg total) by mouth every 6 (six) hours, Starting Sat 8/25/2018, Print           No discharge procedures on file      ED Provider  Electronically Signed by           Ramila Neil PA-C  08/01/19 3325

## 2019-08-02 ENCOUNTER — OCCMED (OUTPATIENT)
Dept: URGENT CARE | Age: 49
End: 2019-08-02
Payer: OTHER MISCELLANEOUS

## 2019-08-02 DIAGNOSIS — S80.811A ABRASION OF RIGHT LOWER EXTREMITY, INITIAL ENCOUNTER: Primary | ICD-10-CM

## 2019-08-02 PROCEDURE — 99213 OFFICE O/P EST LOW 20 MIN: CPT | Performed by: PHYSICIAN ASSISTANT

## 2019-08-05 ENCOUNTER — APPOINTMENT (OUTPATIENT)
Dept: URGENT CARE | Age: 49
End: 2019-08-05
Payer: OTHER MISCELLANEOUS

## 2019-08-05 PROCEDURE — 99213 OFFICE O/P EST LOW 20 MIN: CPT | Performed by: PHYSICIAN ASSISTANT

## 2020-12-23 ENCOUNTER — IMMUNIZATIONS (OUTPATIENT)
Dept: FAMILY MEDICINE CLINIC | Facility: HOSPITAL | Age: 50
End: 2020-12-23

## 2020-12-23 DIAGNOSIS — Z23 ENCOUNTER FOR IMMUNIZATION: ICD-10-CM

## 2020-12-23 PROCEDURE — 0001A SARS-COV-2 / COVID-19 MRNA VACCINE (PFIZER-BIONTECH) 30 MCG: CPT

## 2020-12-23 PROCEDURE — 91300 SARS-COV-2 / COVID-19 MRNA VACCINE (PFIZER-BIONTECH) 30 MCG: CPT

## 2021-01-14 ENCOUNTER — IMMUNIZATIONS (OUTPATIENT)
Dept: FAMILY MEDICINE CLINIC | Facility: HOSPITAL | Age: 51
End: 2021-01-14

## 2021-01-14 DIAGNOSIS — Z23 ENCOUNTER FOR IMMUNIZATION: Primary | ICD-10-CM

## 2021-01-14 PROCEDURE — 91300 SARS-COV-2 / COVID-19 MRNA VACCINE (PFIZER-BIONTECH) 30 MCG: CPT

## 2021-01-14 PROCEDURE — 0002A SARS-COV-2 / COVID-19 MRNA VACCINE (PFIZER-BIONTECH) 30 MCG: CPT

## 2021-07-27 ENCOUNTER — TELEPHONE (OUTPATIENT)
Dept: PSYCHIATRY | Facility: CLINIC | Age: 51
End: 2021-07-27

## 2021-07-27 NOTE — TELEPHONE ENCOUNTER
Pt looking for therapist  I told her I could add her to the wait list and she said she had other #s to call and if she couldn't get in elsewhere she would call us back to add to list

## 2021-12-09 ENCOUNTER — IMMUNIZATIONS (OUTPATIENT)
Dept: FAMILY MEDICINE CLINIC | Facility: HOSPITAL | Age: 51
End: 2021-12-09

## 2021-12-09 DIAGNOSIS — Z23 ENCOUNTER FOR IMMUNIZATION: Primary | ICD-10-CM

## 2021-12-09 PROCEDURE — 0001A COVID-19 PFIZER VACC 0.3 ML: CPT

## 2021-12-09 PROCEDURE — 91300 COVID-19 PFIZER VACC 0.3 ML: CPT

## 2022-03-22 ENCOUNTER — OFFICE VISIT (OUTPATIENT)
Dept: BARIATRICS | Facility: CLINIC | Age: 52
End: 2022-03-22

## 2022-03-22 VITALS
TEMPERATURE: 98.3 F | DIASTOLIC BLOOD PRESSURE: 70 MMHG | HEIGHT: 63 IN | SYSTOLIC BLOOD PRESSURE: 122 MMHG | BODY MASS INDEX: 31.33 KG/M2 | WEIGHT: 176.8 LBS | HEART RATE: 94 BPM

## 2022-03-22 DIAGNOSIS — E78.5 HYPERLIPIDEMIA, UNSPECIFIED HYPERLIPIDEMIA TYPE: ICD-10-CM

## 2022-03-22 DIAGNOSIS — G47.33 OSA (OBSTRUCTIVE SLEEP APNEA): ICD-10-CM

## 2022-03-22 DIAGNOSIS — K21.9 GASTROESOPHAGEAL REFLUX DISEASE, UNSPECIFIED WHETHER ESOPHAGITIS PRESENT: ICD-10-CM

## 2022-03-22 DIAGNOSIS — R45.89 DEPRESSED MOOD: ICD-10-CM

## 2022-03-22 DIAGNOSIS — E66.09 CLASS 1 OBESITY DUE TO EXCESS CALORIES WITH SERIOUS COMORBIDITY AND BODY MASS INDEX (BMI) OF 31.0 TO 31.9 IN ADULT: Primary | ICD-10-CM

## 2022-03-22 PROBLEM — E66.9 OBESITY: Status: ACTIVE | Noted: 2018-03-21

## 2022-03-22 PROCEDURE — 99204 OFFICE O/P NEW MOD 45 MIN: CPT | Performed by: PHYSICIAN ASSISTANT

## 2022-03-22 RX ORDER — BUPROPION HYDROCHLORIDE 150 MG/1
150 TABLET ORAL DAILY
Qty: 30 TABLET | Refills: 2 | Status: SHIPPED | OUTPATIENT
Start: 2022-03-22

## 2022-03-22 NOTE — ASSESSMENT & PLAN NOTE
-Discussed options of [facility]-Intensive Lifestyle Intervention Program, Very Low Calorie Diet-VLCD and Conservative Program and the role of weight loss medications   -Initial weight loss goal of 5-10% weight loss for improved health  -Screening labs  Recommend checking lab coverage before having  -needs labs-cmp, lipid, tsh, a1c, fasting insulin  - STOP BANG-has sleep apnea  -Patient is interested in pursuing conservative program  -started on wellbutrin  with weight 176 8 due to depressed mood and to help with weight loss  -recommend stopping drinking alcohol and discussed other ways to de-stress after work

## 2022-03-22 NOTE — PROGRESS NOTES
Assessment/Plan:    Obesity  -Discussed options of [facility]-Intensive Lifestyle Intervention Program, Very Low Calorie Diet-VLCD and Conservative Program and the role of weight loss medications   -Initial weight loss goal of 5-10% weight loss for improved health  -Screening labs  Recommend checking lab coverage before having  -needs labs-cmp, lipid, tsh, a1c, fasting insulin  - STOP BANG-has sleep apnea  -Patient is interested in pursuing conservative program  -started on wellbutrin  with weight 176 8 due to depressed mood and to help with weight loss  -recommend stopping drinking alcohol and discussed other ways to de-stress after work  SURESH (obstructive sleep apnea)  -encouraged continued use of CPAP machine  -may improve with 20-30% weight loss      Gastroesophageal reflux disease  Controlled with diet    Hyperlipidemia  Lasted check 2014  Recommend recheck    Depressed mood  -She was talking to therapist but stopped  -Will start welbutrin 150 in morning for depressed mood and to help with weight loss  She will contact the office with any mood changes or increase in depression      Follow up in approximately 2 months with Non-Surgical Physician/Advanced Practitioner  Goals:  Food log (ie ) www myfitnesspal com,sparkpeople  com,loseit com,calorieking  com,etc  baritastic  No sugary beverages  At least 64oz of water daily  Increase physical activity by 10 minutes daily  Gradually increase physical activity to a goal of 5 days per week for 30 minutes of MODERATE intensity PLUS 2 days per week of FULL BODY resistance training  No sugary beverages  At least 64oz of water daily  and [id number] calories per day      Diagnoses and all orders for this visit:    Class 1 obesity due to excess calories with serious comorbidity and body mass index (BMI) of 31 0 to 31 9 in adult  -     Comprehensive metabolic panel; Future  -     Hemoglobin A1C; Future  -     Lipid panel;  Future  -     TSH, 3rd generation with Free T4 reflex; Future  -     buPROPion (Wellbutrin XL) 150 mg 24 hr tablet; Take 1 tablet (150 mg total) by mouth daily    SURESH (obstructive sleep apnea)  -     Comprehensive metabolic panel; Future  -     Hemoglobin A1C; Future  -     Lipid panel; Future  -     TSH, 3rd generation with Free T4 reflex; Future  -     buPROPion (Wellbutrin XL) 150 mg 24 hr tablet; Take 1 tablet (150 mg total) by mouth daily    Gastroesophageal reflux disease, unspecified whether esophagitis present    Depressed mood  -     TSH, 3rd generation with Free T4 reflex; Future  -     buPROPion (Wellbutrin XL) 150 mg 24 hr tablet; Take 1 tablet (150 mg total) by mouth daily    Hyperlipidemia, unspecified hyperlipidemia type          Subjective:   Chief Complaint   Patient presents with    Consult       Patient ID: Choco Kaminski  is a 46 y o  female with excess weight/obesity here to pursue weight management  Took Jillian and lost 12 pounds  Has been under a lot of stress with work  Was seeing therapist prior but stopped due to increased stress there  Therapist was not reliable  She was better prior to covid but due to staff shortages she has had increased stress  Started to drink more at night- usually 2 drinks- and sometimes skips eating  She does feel she can stop on her own  History reviewed  No pertinent past medical history      HPI:  Obesity/Excess Weight:  Severity: class I  Onset:  year    Modifiers: Diet and Exercise and Over the Counter Weight Loss Supplements  Contributing factors: Poor Food Choices, Stress/Emotional Eating and Insufficient time to make appropriate lifestyle changes  Associated symptoms: decreased self esteem    Goals:150  Hydration:water 24 ounces, soda 1 glass day, seltzer daily, 2 wine or 2 liquor , coffee or tea w 2 sugar and 2 cream  Alcohol: daily 2 drink   Exercise:nothing outside work (54326 steps daily there)  Occupation:[employer]    B:oatmeal or cereal bar  L:salad or veggies  D:meat, veggie, rice          The following portions of the patient's history were reviewed and updated as appropriate:   She  has no past medical history on file  She   Patient Active Problem List    Diagnosis Date Noted    SURESH (obstructive sleep apnea) 03/22/2022    Depressed mood 03/22/2022    Gastroesophageal reflux disease 03/21/2018    Hyperlipidemia 03/21/2018    Obesity 03/21/2018     She  has no past surgical history on file  Her family history is not on file  She  reports that she has never smoked  She has never used smokeless tobacco  She reports current alcohol use  She reports that she does not use drugs  Current Outpatient Medications   Medication Sig Dispense Refill    COLLAGEN PO Take by mouth      cyanocobalamin (VITAMIN B-12) 100 mcg tablet Take by mouth daily      Melatonin-Pyridoxine (MELATIN PO) Take by mouth as needed      Multiple Vitamin (MULTIVITAMIN) capsule Take 1 capsule by mouth daily      ondansetron (ZOFRAN) 4 mg tablet Take 1 tablet (4 mg total) by mouth every 6 (six) hours 12 tablet 0    buPROPion (Wellbutrin XL) 150 mg 24 hr tablet Take 1 tablet (150 mg total) by mouth daily 30 tablet 2     No current facility-administered medications for this visit       Review of Systems   Constitutional: Negative for chills and fever  HENT: Negative for sore throat  Respiratory: Negative for shortness of breath  Cardiovascular: Negative for chest pain and palpitations  Gastrointestinal: Negative for abdominal pain, constipation, diarrhea and vomiting  -GERD-stopped juice in diet and resolved   Genitourinary: Negative for difficulty urinating  Musculoskeletal: Negative for arthralgias and back pain  Skin: Negative for rash  Neurological: Negative for headaches  Psychiatric/Behavioral: Positive for dysphoric mood  The patient is not nervous/anxious          Objective:    /70 (BP Location: Left arm, Patient Position: Sitting, Cuff Size: Standard)   Pulse 94   Temp 98 3 °F (36 8 °C)   Ht 5' 3" (1 6 m)   Wt 80 2 kg (176 lb 12 8 oz)   BMI 31 32 kg/m²     Physical Exam  Vitals and nursing note reviewed  Constitutional:       General: She is not in acute distress  Appearance: She is well-developed  She is obese  HENT:      Head: Normocephalic and atraumatic  Eyes:      Conjunctiva/sclera: Conjunctivae normal    Neck:      Thyroid: No thyromegaly  Pulmonary:      Effort: Pulmonary effort is normal  No respiratory distress  Skin:     Findings: No rash (visible)  Neurological:      Mental Status: She is alert and oriented to person, place, and time     Psychiatric:         Mood and Affect: Mood normal          Behavior: Behavior normal

## 2022-03-22 NOTE — ASSESSMENT & PLAN NOTE
-She was talking to therapist but stopped  -Will start welbutrin 150 in morning for depressed mood and to help with weight loss   She will contact the office with any mood changes or increase in depression

## 2022-03-22 NOTE — PATIENT INSTRUCTIONS
Recommend stopping alcohol consumption  Try to increase water intake to 60 ounces a day  Goal 1000 calorie diet

## 2022-04-20 ENCOUNTER — APPOINTMENT (OUTPATIENT)
Dept: RADIOLOGY | Age: 52
End: 2022-04-20
Payer: OTHER MISCELLANEOUS

## 2022-04-20 ENCOUNTER — OCCMED (OUTPATIENT)
Dept: URGENT CARE | Age: 52
End: 2022-04-20
Payer: OTHER MISCELLANEOUS

## 2022-04-20 DIAGNOSIS — M54.50 ACUTE BILATERAL LOW BACK PAIN WITHOUT SCIATICA: ICD-10-CM

## 2022-04-20 DIAGNOSIS — S39.012A STRAIN OF MUSCLE, FASCIA AND TENDON OF LOWER BACK, INITIAL ENCOUNTER: Primary | ICD-10-CM

## 2022-04-20 DIAGNOSIS — M62.830 SPASM OF MUSCLE OF LOWER BACK: ICD-10-CM

## 2022-04-20 PROCEDURE — 99283 EMERGENCY DEPT VISIT LOW MDM: CPT

## 2022-04-20 PROCEDURE — G0382 LEV 3 HOSP TYPE B ED VISIT: HCPCS

## 2022-04-20 PROCEDURE — 72100 X-RAY EXAM L-S SPINE 2/3 VWS: CPT

## 2022-04-25 ENCOUNTER — APPOINTMENT (OUTPATIENT)
Dept: URGENT CARE | Age: 52
End: 2022-04-25
Payer: OTHER MISCELLANEOUS

## 2022-04-25 PROCEDURE — 99213 OFFICE O/P EST LOW 20 MIN: CPT

## 2022-12-30 ENCOUNTER — ANESTHESIA EVENT (OUTPATIENT)
Dept: ANESTHESIOLOGY | Facility: HOSPITAL | Age: 52
End: 2022-12-30

## 2022-12-30 ENCOUNTER — ANESTHESIA (OUTPATIENT)
Dept: ANESTHESIOLOGY | Facility: HOSPITAL | Age: 52
End: 2022-12-30

## 2023-01-06 ENCOUNTER — ANESTHESIA (OUTPATIENT)
Dept: GASTROENTEROLOGY | Facility: AMBULARY SURGERY CENTER | Age: 53
End: 2023-01-06

## 2023-01-06 ENCOUNTER — ANESTHESIA EVENT (OUTPATIENT)
Dept: GASTROENTEROLOGY | Facility: AMBULARY SURGERY CENTER | Age: 53
End: 2023-01-06

## 2023-01-06 ENCOUNTER — HOSPITAL ENCOUNTER (OUTPATIENT)
Dept: GASTROENTEROLOGY | Facility: AMBULARY SURGERY CENTER | Age: 53
Setting detail: OUTPATIENT SURGERY
End: 2023-01-06
Attending: COLON & RECTAL SURGERY

## 2023-01-06 VITALS
BODY MASS INDEX: 30.48 KG/M2 | DIASTOLIC BLOOD PRESSURE: 65 MMHG | RESPIRATION RATE: 22 BRPM | HEIGHT: 63 IN | OXYGEN SATURATION: 100 % | SYSTOLIC BLOOD PRESSURE: 100 MMHG | TEMPERATURE: 97.1 F | HEART RATE: 66 BPM | WEIGHT: 172 LBS

## 2023-01-06 DIAGNOSIS — Z12.11 ENCOUNTER FOR SCREENING COLONOSCOPY: ICD-10-CM

## 2023-01-06 RX ORDER — SODIUM CHLORIDE, SODIUM LACTATE, POTASSIUM CHLORIDE, CALCIUM CHLORIDE 600; 310; 30; 20 MG/100ML; MG/100ML; MG/100ML; MG/100ML
INJECTION, SOLUTION INTRAVENOUS CONTINUOUS PRN
Status: DISCONTINUED | OUTPATIENT
Start: 2023-01-06 | End: 2023-01-06

## 2023-01-06 RX ORDER — PROPOFOL 10 MG/ML
INJECTION, EMULSION INTRAVENOUS AS NEEDED
Status: DISCONTINUED | OUTPATIENT
Start: 2023-01-06 | End: 2023-01-06

## 2023-01-06 RX ORDER — LIDOCAINE HYDROCHLORIDE 10 MG/ML
INJECTION, SOLUTION EPIDURAL; INFILTRATION; INTRACAUDAL; PERINEURAL AS NEEDED
Status: DISCONTINUED | OUTPATIENT
Start: 2023-01-06 | End: 2023-01-06

## 2023-01-06 RX ADMIN — LIDOCAINE HYDROCHLORIDE 50 MG: 10 INJECTION, SOLUTION EPIDURAL; INFILTRATION; INTRACAUDAL; PERINEURAL at 07:38

## 2023-01-06 RX ADMIN — PROPOFOL 50 MG: 10 INJECTION, EMULSION INTRAVENOUS at 07:42

## 2023-01-06 RX ADMIN — PROPOFOL 100 MG: 10 INJECTION, EMULSION INTRAVENOUS at 07:38

## 2023-01-06 RX ADMIN — SODIUM CHLORIDE, SODIUM LACTATE, POTASSIUM CHLORIDE, AND CALCIUM CHLORIDE: .6; .31; .03; .02 INJECTION, SOLUTION INTRAVENOUS at 07:34

## 2023-01-06 RX ADMIN — PROPOFOL 50 MG: 10 INJECTION, EMULSION INTRAVENOUS at 07:48

## 2023-01-06 RX ADMIN — PROPOFOL 50 MG: 10 INJECTION, EMULSION INTRAVENOUS at 07:51

## 2023-01-06 RX ADMIN — PROPOFOL 50 MG: 10 INJECTION, EMULSION INTRAVENOUS at 07:45

## 2023-01-06 NOTE — ANESTHESIA POSTPROCEDURE EVALUATION
Post-Op Assessment Note    CV Status:  Stable    Pain management: adequate     Mental Status:  Sleepy   Hydration Status:  Euvolemic   PONV Controlled:  Controlled   Airway Patency:  Patent      Post Op Vitals Reviewed: Yes      Staff: CRNA         No notable events documented      BP 95/62 (01/06/23 0755)    Temp (!) 97 1 °F (36 2 °C) (01/06/23 0755)    Pulse 77 (01/06/23 0755)   Resp 20 (01/06/23 0755)    SpO2 95 % (01/06/23 0755)

## 2023-01-06 NOTE — ANESTHESIA PREPROCEDURE EVALUATION
Procedure:  COLONOSCOPY    Relevant Problems   CARDIO   (+) Hyperlipidemia      GI/HEPATIC   (+) Gastroesophageal reflux disease      PULMONARY   (+) SURESH (obstructive sleep apnea)        Physical Exam    Airway    Mallampati score: III  TM Distance: >3 FB       Dental   No notable dental hx     Cardiovascular  Cardiovascular exam normal    Pulmonary  Pulmonary exam normal     Other Findings        Anesthesia Plan  ASA Score- 2     Anesthesia Type- IV sedation with anesthesia with ASA Monitors  Additional Monitors:   Airway Plan:           Plan Factors-Exercise tolerance (METS): >4 METS  Chart reviewed  Patient summary reviewed  Patient is not a current smoker  Induction- intravenous  Postoperative Plan-     Informed Consent- Anesthetic plan and risks discussed with patient

## 2023-01-06 NOTE — H&P
History and Physical   Colon and Rectal Surgery   Delmus Schwab 45 y o  female MRN: [id number]  Unit/Bed#:  Encounter: [id number]  01/06/23   7:34 AM      CC:  Screening for neoplasms of the colon  History of Present Illness   HPI:  Delmus Schwab is a 45 y o  female with no GI symptoms  Historical Information   Past Medical History:   Diagnosis Date   • Asthma     Bronchitis seasonal     History reviewed  No pertinent surgical history  Meds/Allergies     (Not in a hospital admission)        Current Outpatient Medications:   •  buPROPion (Wellbutrin XL) 150 mg 24 hr tablet, Take 1 tablet (150 mg total) by mouth daily, Disp: 30 tablet, Rfl: 2  •  Melatonin-Pyridoxine (MELATIN PO), Take by mouth as needed, Disp: , Rfl:   •  Multiple Vitamin (MULTIVITAMIN) capsule, Take 1 capsule by mouth daily, Disp: , Rfl:   •  COLLAGEN PO, Take by mouth, Disp: , Rfl:   •  cyanocobalamin (VITAMIN B-12) 100 mcg tablet, Take by mouth daily, Disp: , Rfl:   •  ondansetron (ZOFRAN) 4 mg tablet, Take 1 tablet (4 mg total) by mouth every 6 (six) hours, Disp: 12 tablet, Rfl: 0    No Known Allergies      Social History   Social History     Substance and Sexual Activity   Alcohol Use Yes    Comment: occassional     Social History     Substance and Sexual Activity   Drug Use No     Social History     Tobacco Use   Smoking Status Never   Smokeless Tobacco Never     Review of Systems - General ROS: negative  Respiratory ROS: negative  Cardiovascular ROS: negative     Family History: History reviewed  No pertinent family history        Objective     Current Vitals:   Blood Pressure: 124/71 (01/06/23 0704)  Pulse: 77 (01/06/23 0704)  Temperature: (!) 97 2 °F (36 2 °C) (01/06/23 0704)  Temp Source: Temporal (01/06/23 0704)  Respirations: 18 (01/06/23 0704)  Height: 5' 3" (160 cm) (01/06/23 0704)  Weight - Scale: 78 kg (172 lb) (01/06/23 0704)  SpO2: 97 % (01/06/23 0704)  No intake or output data in the 24 hours ending 01/06/23 0734    Physical Exam:  General:  Well nourished, no distress  Neuro: Alert and oriented  Eyes:Sclera anicteric, conjunctiva pink  Pulm: Clear to auscultation bilaterally  No respiratory Distress  CV:  Regular rate and rhythm  No murmurs  Abdomen:  Soft, flat, non-tender, without masses or hepatosplenomegaly  Lab Results:       ASSESSMENT:  Dione Schreiber is a 45 y o  female for screening  PLAN:  Colonoscopy  Risks , including, but not limited to, bleeding, perforation, missed lesions, and potential need for surgery, were reviewed  Alternatives to colonoscopy were discussed    Rudy Perera MD